# Patient Record
(demographics unavailable — no encounter records)

---

## 2025-01-20 NOTE — HISTORY OF PRESENT ILLNESS
[FreeTextEntry1] : npa: acne [de-identified] : 36M presenting today as a new patient with his wife for evaluation of acne on the back x years. Not itchy, not painful. No treatments tried. No history of acne in the past.

## 2025-01-20 NOTE — ASSESSMENT
[FreeTextEntry1] : #Folliculitis (L73.9) - Discussed nature, chronicity and unpredictable course. - Diagnosis reviewed.  - Start BPO 10% wash QOD. SED and appropriate use reviewed - Start Clindamycin lotion 1-2 times daily as needed.  RTC 3-4 mos or PRN.

## 2025-01-20 NOTE — PHYSICAL EXAM
[FreeTextEntry3] : Focused skin exam performed  The relevant portions of the exam were performed today  AAOx3, NAD, well-appearing / pleasant Focused examination within normal limits with the exception of: Many scattered follicularly based papules and pustules with associated hyperpigmentation on the back, chest clear

## 2025-01-20 NOTE — HISTORY OF PRESENT ILLNESS
[FreeTextEntry1] : npa: acne [de-identified] : 36M presenting today as a new patient with his wife for evaluation of acne on the back x years. Not itchy, not painful. No treatments tried. No history of acne in the past.